# Patient Record
Sex: MALE | Race: OTHER
[De-identification: names, ages, dates, MRNs, and addresses within clinical notes are randomized per-mention and may not be internally consistent; named-entity substitution may affect disease eponyms.]

---

## 2020-03-17 ENCOUNTER — HOSPITAL ENCOUNTER (EMERGENCY)
Dept: HOSPITAL 12 - ER | Age: 40
Discharge: HOME | End: 2020-03-17
Payer: COMMERCIAL

## 2020-03-17 VITALS — HEIGHT: 75 IN | WEIGHT: 265 LBS | BODY MASS INDEX: 32.95 KG/M2

## 2020-03-17 DIAGNOSIS — R05: ICD-10-CM

## 2020-03-17 DIAGNOSIS — J06.9: Primary | ICD-10-CM

## 2020-03-17 PROCEDURE — A4663 DIALYSIS BLOOD PRESSURE CUFF: HCPCS

## 2020-03-17 NOTE — NUR
Patient discharged to home in stable conditon.  Written and verbal after care 
instructions given. 

Patient verbalized understanding of instructions. Pt was given very extensive 
verbal ACI by . Stressed follow up as needed for worsening s/s.